# Patient Record
Sex: FEMALE | Race: WHITE | Employment: FULL TIME | URBAN - METROPOLITAN AREA
[De-identification: names, ages, dates, MRNs, and addresses within clinical notes are randomized per-mention and may not be internally consistent; named-entity substitution may affect disease eponyms.]

---

## 2017-01-09 ENCOUNTER — HOSPITAL ENCOUNTER (OUTPATIENT)
Dept: PERINATAL CARE | Age: 32
Discharge: HOME | End: 2017-01-09
Attending: OBSTETRICS & GYNECOLOGY
Payer: COMMERCIAL

## 2017-01-09 ENCOUNTER — ROUTINE PRENATAL (OUTPATIENT)
Dept: OBGYN CLINIC | Age: 32
End: 2017-01-09

## 2017-01-09 VITALS — SYSTOLIC BLOOD PRESSURE: 110 MMHG | WEIGHT: 177 LBS | BODY MASS INDEX: 31.35 KG/M2 | DIASTOLIC BLOOD PRESSURE: 72 MMHG

## 2017-01-09 DIAGNOSIS — Z34.03 NORMAL FIRST PREGNANCY CONFIRMED, CURRENTLY IN THIRD TRIMESTER: Primary | ICD-10-CM

## 2017-01-09 PROCEDURE — 76816 OB US FOLLOW-UP PER FETUS: CPT | Performed by: OBSTETRICS & GYNECOLOGY

## 2017-01-09 NOTE — PATIENT INSTRUCTIONS

## 2017-01-09 NOTE — PROGRESS NOTES
Problem List  Date Reviewed: 12/12/2016          Codes Class Noted    Encounter for supervision of normal first pregnancy in first trimester ICD-10-CM: Z34.01  ICD-9-CM: V22.0  7/26/2016    Overview Addendum 11/23/2016  2:38 PM by Olena Obando     Intrauterine pregnancy with the following problems identified:  EDC 2/20/2017 by US today  10/31/16 Flu vaccine given

## 2017-01-23 ENCOUNTER — ROUTINE PRENATAL (OUTPATIENT)
Dept: OBGYN CLINIC | Age: 32
End: 2017-01-23

## 2017-01-23 VITALS — WEIGHT: 179.8 LBS | SYSTOLIC BLOOD PRESSURE: 110 MMHG | DIASTOLIC BLOOD PRESSURE: 68 MMHG | BODY MASS INDEX: 31.85 KG/M2

## 2017-01-23 DIAGNOSIS — Z34.03 ENCOUNTER FOR SUPERVISION OF NORMAL FIRST PREGNANCY IN THIRD TRIMESTER: Primary | ICD-10-CM

## 2017-01-23 LAB — GRBS, EXTERNAL: NEGATIVE

## 2017-01-23 NOTE — PROGRESS NOTES
Problem List  Date Reviewed: 1/9/2017          Codes Class Noted    Encounter for supervision of normal first pregnancy in first trimester ICD-10-CM: Z34.01  ICD-9-CM: V22.0  7/26/2016    Overview Addendum 1/10/2017  3:46 PM by Olena Obando     Intrauterine pregnancy with the following problems identified:  EDC 2/20/2017 by [address] today  10/31/16 Flu vaccine given  Pyelectasis @ 20 wk US--being followed by [facility]

## 2017-01-23 NOTE — PATIENT INSTRUCTIONS

## 2017-01-24 ENCOUNTER — TELEPHONE (OUTPATIENT)
Dept: OBGYN CLINIC | Age: 32
End: 2017-01-24

## 2017-01-24 NOTE — TELEPHONE ENCOUNTER
28years old  36w1d pregnant patient last seen in the office yesterday. Patient had cervical exam at the visit. Patient calling to say that she had spotting last evening and note this afternoon a \"decent bit of bleeding , brownish red with mucous\" and she has cramping.   Please advise

## 2017-01-24 NOTE — TELEPHONE ENCOUNTER
If she is soaking maxi pads needs to be seen. It's from her exam. Call if contractions q5 x 1 hour.  Make sure baby moving ok

## 2017-01-25 LAB — GP B STREP DNA SPEC QL NAA+PROBE: NEGATIVE

## 2017-01-30 ENCOUNTER — HOSPITAL ENCOUNTER (OUTPATIENT)
Dept: PERINATAL CARE | Age: 32
Discharge: HOME | End: 2017-01-30
Attending: OBSTETRICS & GYNECOLOGY
Payer: COMMERCIAL

## 2017-01-30 PROCEDURE — 76816 OB US FOLLOW-UP PER FETUS: CPT | Performed by: OBSTETRICS & GYNECOLOGY

## 2017-01-31 ENCOUNTER — ROUTINE PRENATAL (OUTPATIENT)
Dept: OBGYN CLINIC | Age: 32
End: 2017-01-31

## 2017-01-31 VITALS — DIASTOLIC BLOOD PRESSURE: 60 MMHG | WEIGHT: 183 LBS | BODY MASS INDEX: 32.42 KG/M2 | SYSTOLIC BLOOD PRESSURE: 112 MMHG

## 2017-01-31 DIAGNOSIS — Z34.03 ENCOUNTER FOR SUPERVISION OF NORMAL FIRST PREGNANCY IN THIRD TRIMESTER: Primary | ICD-10-CM

## 2017-01-31 NOTE — PROGRESS NOTES
Problem List  Date Reviewed: 2017          Codes Class Noted    Encounter for supervision of normal first pregnancy in first trimester ICD-10-CM: Z34.01  ICD-9-CM: V22.0  2016    Overview Addendum 2017  2:10 PM by Royer Larson MD     Intrauterine pregnancy with the following problems identified:  EDC 2017 by US today  10/31/16 Flu vaccine given  Pyelectasis @ 20 wk US--being followed by MF -  fu

## 2017-01-31 NOTE — PATIENT INSTRUCTIONS
Week 37 of Your Pregnancy: Care Instructions  Your Care Instructions    You are near the end of your pregnancy--and you're probably pretty uncomfortable. It may be harder to walk around. Lying down probably isn't comfortable either. You may have trouble getting to sleep or staying asleep. Most women deliver their babies between 40 and 41 weeks. This is a good time to think about packing a bag for the hospital with items you'll need. Then you'll be ready when labor starts. Follow-up care is a key part of your treatment and safety. Be sure to make and go to all appointments, and call your doctor if you are having problems. It's also a good idea to know your test results and keep a list of the medicines you take. How can you care for yourself at home? Learn about breastfeeding  · Breastfeeding is best for your baby and good for you. · Breast milk has antibodies to help your baby fight infections. · Mothers who breastfeed often lose weight faster, because making milk burns calories. · Learning the best ways to hold your baby will make breastfeeding easier. · Let your partner bathe and diaper the baby to keep your partner from feeling left out. Snuggle together when you breastfeed. · You may want to learn how to use a breast pump and store your milk. · If you choose to bottle feed, make the feeding feel like breastfeeding so you can bond with your baby. Always hold your baby and the bottle. Do not prop bottles or let your baby fall asleep with a bottle. Learn about crying  · It is common for babies to cry for 1 to 3 hours a day. Some cry more, some cry less. · Babies don't cry to make you upset or because you are a bad parent. · Crying is how your baby communicates. Your baby may be hungry; have gas; need a diaper change; or feel cold, warm, tired, lonely, or tense. Sometimes babies cry for unknown reasons. · If you respond to your baby's needs, he or she will learn to trust you.   · Try to stay calm when your baby cries. Your baby may get more upset if he or she senses that you are upset. Know how to care for your   · Your baby's umbilical cord stump will drop off on its own, usually between 1 and 2 weeks. To care for your baby's umbilical cord area:  ¨ Clean the area at the bottom of the cord 2 or 3 times a day. ¨ Pay special attention to the area where the cord attaches to the skin. ¨ Keep the diaper folded below the cord. ¨ Use a damp washcloth or cotton ball to sponge bathe your baby until the stump has come off. · Your baby's first dark stool is called meconium. After the meconium is passed, your baby will develop his or her own bowel pattern. ¨ Some babies, especially  babies, have several bowel movements a day. Others have one or two a day, or one every 2 to 3 days. ¨  babies often have loose, yellow stools. Formula-fed babies have more formed stools. ¨ If your baby's stools look like little pellets, he or she is constipated. After 2 days of constipation, call your baby's doctor. · If your baby will be circumcised, you can care for him at home. ¨ Gently rinse his penis with warm water after every diaper change. Do not try to remove the film that forms on the penis. This film will go away on its own. Pat dry. ¨ Put petroleum ointment, such as Vaseline, on the area of the diaper that will touch your baby's penis. This will keep the diaper from sticking to your baby. ¨ Ask the doctor about giving your baby acetaminophen (Tylenol) for pain. Where can you learn more? Go to http://natalia-becky.info/. Enter 68 [id number] in the search box to learn more about \"Week 37 of Your Pregnancy: Care Instructions. \"  Current as of: May 30, 2016  Content Version: 11.1  © 3804-7410 [employer]. Care instructions adapted under license by [employer] (which disclaims liability or warranty for this information).  If you have questions about a medical condition or this instruction, always ask your healthcare professional. [place] [address] any warranty or liability for your use of this information.

## 2017-02-07 ENCOUNTER — ROUTINE PRENATAL (OUTPATIENT)
Dept: OBGYN CLINIC | Age: 32
End: 2017-02-07

## 2017-02-07 VITALS — DIASTOLIC BLOOD PRESSURE: 60 MMHG | BODY MASS INDEX: 32.77 KG/M2 | WEIGHT: 185 LBS | SYSTOLIC BLOOD PRESSURE: 118 MMHG

## 2017-02-07 DIAGNOSIS — Z34.03 ENCOUNTER FOR SUPERVISION OF NORMAL FIRST PREGNANCY IN THIRD TRIMESTER: Primary | ICD-10-CM

## 2017-02-07 NOTE — PATIENT INSTRUCTIONS
Week 38 of Your Pregnancy: Care Instructions  Your Care Instructions    Believe it or not, your baby is almost here. You may have ideas about your baby's personality because of how much he or she moves. Or you may have noticed how he or she responds to sounds, warmth, cold, and light. You may even know what kind of music your baby likes. By now, you have a better idea of what to expect during delivery. You may have talked about your birth preferences with your doctor. But even if you want a vaginal birth, it is a good idea to learn about  births.  birth means that your baby is born through a cut (incision) in your lower belly. It is sometimes the best choice for the health of the baby and the mother. This care sheet can help you understand  births. It also gives you information about what to expect after your baby is born. And it helps you understand more about postpartum depression. Follow-up care is a key part of your treatment and safety. Be sure to make and go to all appointments, and call your doctor if you are having problems. It's also a good idea to know your test results and keep a list of the medicines you take. How can you care for yourself at home? Learn about  birth  · Most C-sections are unplanned. They are done because of problems that occur during labor. These problems might include:  ¨ Labor that slows or stops. ¨ High blood pressure or other problems for the mother. ¨ Signs of distress in the baby. These signs may include a very fast or slow heart rate. · Although most mothers and babies do well after , it is major surgery. It has more risks than a vaginal delivery. · In some cases, a planned  may be safer than a vaginal delivery. This may be the case if:  ¨ The mother has a health problem, such as a heart condition. ¨ The baby isn't in a head-down position for delivery. This is called a breech position.   ¨ The uterus has scars from past surgeries. This could increase the chance of a tear in the uterus. ¨ There is a problem with the placenta. ¨ The mother has an infection, such as genital herpes, that could be spread to the baby. ¨ The mother is having twins or more. ¨ The baby weighs 9 to 10 pounds or more. · Because of the risks of , planned C-sections generally should be done only for medical reasons. And a planned  should be done at 39 weeks or later unless there is a medical reason to do it sooner. Know what to expect after delivery, and plan for the first few weeks at home  · You, your baby, and your partner or  will get identification bands. Only people with matching bands can  the baby from the nursery. · You will learn how to feed, diaper, and bathe your baby. And you will learn how to care for the umbilical cord stump. If your baby will be circumcised, you will also learn how to care for that. · Ask people to wait to visit you until you are at home. And ask them to wash their hands before they touch your baby. · Make sure you have another adult in your home for at least 2 or 3 days after the birth. · During the first 2 weeks, limit when friends and family can visit. · Do not allow visitors who have colds or infections. Make sure all visitors are up to date with their vaccinations. Never let anyone smoke around your baby. · Try to nap when the baby naps. Be aware of postpartum depression  · \"Baby blues\" are common for the first 1 to 2 weeks after birth. You may cry or feel sad or irritable for no reason. · For some women, these feelings last longer and are more intense. This is called postpartum depression. · If your symptoms last for more than a few weeks or you feel very depressed, ask your doctor for help. · Postpartum depression can be treated. Support groups and counseling can help. Sometimes medicine can also help. Where can you learn more?   Go to http://natalia-becky.info/. Enter B044 in the search box to learn more about \"Week 38 of Your Pregnancy: Care Instructions. \"  Current as of: May 30, 2016  Content Version: 11.1  © 8856-1961 "[employer]. Care instructions adapted under license by [employer] (which disclaims liability or warranty for this information). If you have questions about a medical condition or this instruction, always ask your healthcare professional. [address] any warranty or liability for your use of this information.

## 2017-02-07 NOTE — PROGRESS NOTES
Problem List  Date Reviewed: 2017          Codes Class Noted    Encounter for supervision of normal first pregnancy in first trimester ICD-10-CM: Z34.01  ICD-9-CM: V22.0  2016    Overview Addendum 2017  2:10 PM by Kathy Higgins MD     Intrauterine pregnancy with the following problems identified:  EDC 2017 by US today  10/31/16 Flu vaccine given  Pyelectasis @ 20 wk US--being followed by MF -  fu

## 2017-02-15 ENCOUNTER — ROUTINE PRENATAL (OUTPATIENT)
Dept: OBGYN CLINIC | Age: 32
End: 2017-02-15

## 2017-02-15 VITALS — WEIGHT: 185.6 LBS | DIASTOLIC BLOOD PRESSURE: 70 MMHG | BODY MASS INDEX: 32.88 KG/M2 | SYSTOLIC BLOOD PRESSURE: 118 MMHG

## 2017-02-15 DIAGNOSIS — Z34.03 ENCOUNTER FOR SUPERVISION OF NORMAL FIRST PREGNANCY IN THIRD TRIMESTER: Primary | ICD-10-CM

## 2017-02-15 NOTE — PATIENT INSTRUCTIONS
Week 39 of Your Pregnancy: Care Instructions  Your Care Instructions    During these final weeks, you may feel anxious to see your new baby. [place] babies often look different from what you see in pictures or movies. Right after birth, their heads may have a strange shape. Their eyes may be puffy. And their genitals may be swollen. They may also have very dry skin, or red marks on the eyelids, nose, or neck. Still, most parents think their babies are beautiful. Follow-up care is a key part of your treatment and safety. Be sure to make and go to all appointments, and call your doctor if you are having problems. It's also a good idea to know your test results and keep a list of the medicines you take. How can you care for yourself at home? Prepare to breastfeed  · If you are breastfeeding, continue to eat healthy foods. · Avoid alcohol, cigarettes, and drugs. This includes prescription and over-the-counter medicines. · You can help prevent sore nipples if you feed your baby in the correct position. Nurses will help you learn to do this. · Your  will need to be fed about every 1½ to 3 hours. Choose the right birth control after your baby is born  · Women who are breastfeeding can still get pregnant. Use birth control if you don't want to get pregnant. · Intrauterine devices (IUDs) work for women who want to wait at least 2 years before getting pregnant again. They are safe to use while you are breastfeeding. · Depo-Provera can be used while you are breastfeeding. It is a shot you get every 3 months. · Birth control pills work well. But you need a different kind of pill while you are breastfeeding. And when you start taking these pills, you need to make sure to use another type of birth control until you start your second pack. · Diaphragms, cervical caps, tubal implants, and condoms with spermicide work less well after birth.  If you have a diaphragm or cervical cap, you will need to have it refitted. · Tubal ligation (tying your tubes) and vasectomy are both permanent. These are good options if you are sure you are done having children. Where can you learn more? Go to http://natalia-becky.info/. Enter O257 in the search box to learn more about \"Week 39 of Your Pregnancy: Care Instructions. \"  Current as of: May 30, 2016  Content Version: 11.1  © 4013-0137 [employer]. Care instructions adapted under license by [employer] (which disclaims liability or warranty for this information). If you have questions about a medical condition or this instruction, always ask your healthcare professional. [address] any warranty or liability for your use of this information.

## 2017-02-15 NOTE — PROGRESS NOTES
Problem List  Date Reviewed: 2017          Codes Class Noted    Encounter for supervision of normal first pregnancy in first trimester ICD-10-CM: Z34.01  ICD-9-CM: V22.0  2016    Overview Addendum 2017  2:10 PM by Anusha Hickey MD     Intrauterine pregnancy with the following problems identified:  EDC 2017 by US today  10/31/16 Flu vaccine given  Pyelectasis @ 20 wk US--being followed by MF -  fu

## 2017-02-21 ENCOUNTER — ANESTHESIA EVENT (OUTPATIENT)
Dept: LABOR AND DELIVERY | Age: 32
End: 2017-02-21
Payer: COMMERCIAL

## 2017-02-21 ENCOUNTER — ANESTHESIA (OUTPATIENT)
Dept: LABOR AND DELIVERY | Age: 32
End: 2017-02-21
Payer: COMMERCIAL

## 2017-02-21 PROBLEM — Z34.90 PREGNANCY: Status: ACTIVE | Noted: 2017-02-21

## 2017-02-21 PROCEDURE — 77030014125 HC TY EPDRL BBMI -B: Performed by: ANESTHESIOLOGY

## 2017-02-21 PROCEDURE — 74011000250 HC RX REV CODE- 250

## 2017-02-21 RX ORDER — LIDOCAINE HYDROCHLORIDE AND EPINEPHRINE 15; 5 MG/ML; UG/ML
INJECTION, SOLUTION EPIDURAL AS NEEDED
Status: DISCONTINUED | OUTPATIENT
Start: 2017-02-21 | End: 2017-02-21 | Stop reason: HOSPADM

## 2017-02-21 RX ORDER — BUPIVACAINE HYDROCHLORIDE 2.5 MG/ML
INJECTION, SOLUTION EPIDURAL; INFILTRATION; INTRACAUDAL AS NEEDED
Status: DISCONTINUED | OUTPATIENT
Start: 2017-02-21 | End: 2017-02-21 | Stop reason: HOSPADM

## 2017-02-21 RX ADMIN — LIDOCAINE HYDROCHLORIDE AND EPINEPHRINE 3 ML: 15; 5 INJECTION, SOLUTION EPIDURAL at 10:34

## 2017-02-21 RX ADMIN — BUPIVACAINE HYDROCHLORIDE 1 ML: 2.5 INJECTION, SOLUTION EPIDURAL; INFILTRATION; INTRACAUDAL at 10:33

## 2017-02-21 NOTE — ANESTHESIA PREPROCEDURE EVALUATION
Anesthetic History   No history of anesthetic complications            Review of Systems / Medical History  Patient summary reviewed, nursing notes reviewed and pertinent labs reviewed    Pulmonary  Within defined limits                 Neuro/Psych   Within defined limits           Cardiovascular  Within defined limits                Exercise tolerance: >4 METS     GI/Hepatic/Renal  Within defined limits              Endo/Other  Within defined limits           Other Findings              Physical Exam    Airway  Mallampati: II  TM Distance: 4 - 6 cm  Neck ROM: normal range of motion   Mouth opening: Normal     Cardiovascular  Regular rate and rhythm,  S1 and S2 normal,  no murmur, click, rub, or gallop  Rhythm: regular  Rate: normal         Dental  No notable dental hx       Pulmonary  Breath sounds clear to auscultation               Abdominal  GI exam deferred       Other Findings            Anesthetic Plan    ASA: 2  Anesthesia type: epidural and spinal            Anesthetic plan and risks discussed with: Patient

## 2017-02-21 NOTE — ANESTHESIA PROCEDURE NOTES
CSE Block    Start time: 2/21/2017 10:27 AM  End time: 2/21/2017 10:36 AM  Performed by: Dionisio Reed by: Sheryle Pali     Pre-Procedure  Indications: procedure for pain    preanesthetic checklist: patient identified, risks and benefits discussed, anesthesia consent, site marked, patient being monitored and timeout performed    Timeout Time: 10:27        Procedure:   Patient Position:  Seated  Prep Region:  Lumbar  Prep: Betadine and patient draped    Location:  L3-4    Epidural Needle:   Needle Type:  Tuohy  Needle Gauge:  18 G  Injection Technique:  Loss of resistance using saline  Attempts:  1    Spinal Needle:   Needle Type:  Pencil-tip  Needle Gauge:  27 G    Catheter:   Catheter Type:  Standard  Catheter Size:  20 G  Catheter at Skin Depth (cm):  10  Depth in Epidural Space (cm):  4  Events: no blood with aspiration, no cerebrospinal fluid with aspiration, no paresthesia and negative aspiration test    Test Dose:  Lidocaine 1.5% w/ epi and negative    Assessment:   Catheter Secured:  Tegaderm and tape  Insertion:  Uncomplicated  Patient tolerance:  Patient tolerated the procedure well with no immediate complications

## 2017-03-17 ENCOUNTER — OFFICE VISIT (OUTPATIENT)
Dept: OBGYN CLINIC | Age: 32
End: 2017-03-17

## 2017-03-17 RX ORDER — ESCITALOPRAM OXALATE 10 MG/1
10 TABLET ORAL DAILY
Qty: 30 TAB | Refills: 12 | Status: SHIPPED | OUTPATIENT
Start: 2017-03-17 | End: 2017-04-17

## 2017-03-17 NOTE — PROGRESS NOTES
Chief Complaint   Depression      MARIA LUISA Dawson is a 28 y.o. female who presents for the evaluation of postpartum depression. No LMP recorded. The patient complains of anxiety, depression, feeling overwhelmed. The symptoms are moderate. They started 1 week ago. Since then they have become gradually worsening. Associated symptoms: none. The patient denies wanting to harm herself or the baby. She is breast and bottle feeding. Patient states she has ADHD \"and that comes with depression\". Past Medical History:   Diagnosis Date    ADHD (attention deficit hyperactivity disorder), combined type      History reviewed. No pertinent surgical history. Social History     Occupational History    Not on file. Social History Main Topics    Smoking status: Never Smoker    Smokeless tobacco: Never Used    Alcohol use No    Drug use: No    Sexual activity: Not Currently     Partners: Male     Birth control/ protection: None     Family History   Problem Relation Age of Onset    Heart defect Brother     Diabetes Maternal Grandmother     Diabetes Maternal Grandfather     Heart Attack Maternal Grandfather     Stroke Maternal Grandfather     Diabetes Paternal Grandmother     Diabetes Paternal Grandfather        Allergies   Allergen Reactions    Amoxicillin Hives    Penicillins Hives     Prior to Admission medications    Medication Sig Start Date End Date Taking? Authorizing Provider   prenatal multivit-ca-min-fe-fa tab Take  by mouth. Yes Historical Provider   oxyCODONE-acetaminophen (PERCOCET) 5-325 mg per tablet Take 1-2 Tabs by mouth every four (4) hours as needed for Pain. Max Daily Amount: 12 Tabs. 2/22/17   Lisa Joseph MD   calcium carbonate (TUMS) 200 mg calcium (500 mg) chew Take 1 Tab by mouth daily.  Indications: HEARTBURN    Historical Provider        Review of Systems: History obtained from the patient  Constitutional: negative for weight loss, fever, night sweats  Breast: negative for breast lumps, nipple discharge, galactorrhea  GI: negative for change in bowel habits, abdominal pain, black or bloody stools  : negative for frequency, dysuria, hematuria, vaginal discharge  MSK: negative for back pain, joint pain, muscle pain  Skin: negative for itching, rash, hives  Psych: negative for anxiety, depression, change in mood      Objective: There were no vitals taken for this visit. Physical Exam:   PHYSICAL EXAMINATION    Constitutional  · Appearance: well-nourished, well developed, alert, in no acute distress, not teary    Skin  · General Inspection: no rash, no lesions identified    Neurologic/Psychiatric  · Mental Status:  · Orientation: grossly oriented to person, place and time  · Mood and Affect: mood normal, affect appropriate    Assessment:   Pp depression anxiety  No danger to self or baby    Plan:   Try Lexapro 10mg  See counselor      RTO prn if symptoms persist or worsen. Instructions given to pt. Handouts given to pt.

## 2017-03-17 NOTE — PATIENT INSTRUCTIONS
Nutrition for Breastfeeding Mothers: Care Instructions  Your Care Instructions  When a woman breastfeeds her baby, she needs more nutrients to keep herself healthy and to make the baby's milk. Breastfeeding helps build the bond between you and your baby. It gives your baby excellent health benefits. A healthy diet includes eating a variety of foods from the basic food groups: grains, vegetables, fruits, milk and milk products (such as cheese and yogurt), and meat and dried beans. Eating well during breastfeeding will ensure that you stay healthy and your baby grows and develops normally. Follow-up care is a key part of your treatment and safety. Be sure to make and go to all appointments, and call your doctor if you are having problems. It's also a good idea to know your test results and keep a list of the medicines you take. How can you care for yourself at home? · Include 3 to 4 cups of nonfat or low-fat milk or milk products in your diet every day. These include:  ¨ Milk (8 ounces equals 1 cup). ¨ Ice cream (1½ cups equals 1 cup of milk). ¨ Cheese (1½ ounces of cheese equals 1 cup). ¨ Yogurt (8 ounces equals 1 cup). · Eat at least 7 ounces of grains, such as cereals, breads, crackers, rice, or pasta, every day. One ounce is about 1 slice of bread, 1 cup of breakfast cereal, or ½ cup of cooked rice, cereal, or pasta. · Eat 3 cups of vegetables each day. Choices include:  ¨ Dark-green vegetables such as broccoli and spinach. ¨ Orange vegetables such as carrots and sweet potatoes. ¨ Dried beans (such as issa and kidney beans) and peas (such as lentils). · Every day, eat 2 cups of fresh, frozen, or canned fruit. · Eat 6½ ounces each day of protein, such as chicken, fish, lean meat, eggs, peanut butter, dried beans and peas, nuts, and seeds. One egg, 1 tablespoon of peanut butter, or ½ ounce of nuts or seeds equals 1 ounce of protein. A ½ cup of cooked beans equals 2 ounces of protein.   · Drink plenty of fluids, enough so that your urine is light yellow or clear like water. If you have kidney, heart, or liver disease and have to limit fluids, talk with your doctor before you increase the amount of fluids you drink. · Limit caffeine products, such as coffee, tea, chocolate, and some sodas. Caffeine can pass to your baby through breast milk. It may cause fussiness and sleep problems in babies. · Your doctor may recommend a vitamin supplement. Take it as recommended. · Consider joining a breastfeeding support group. These are offered at many hospitals and birthing centers by nurses, nurse-midwives, or lactation consultants. When should you call for help? Watch closely for changes in your health, and be sure to contact your doctor if:  · You feel that you are not making enough milk for your baby. · You are losing a lot of weight. · You do not think your baby is gaining enough weight. · You would like help to plan a healthy diet. Where can you learn more? Go to http://natalia-becky.info/. Enter P234 in the search box to learn more about \"Nutrition for Breastfeeding Mothers: Care Instructions. \"  Current as of: May 30, 2016  Content Version: 11.1  © 9908-8287 [employer]. Care instructions adapted under license by [employer] (which disclaims liability or warranty for this information). If you have questions about a medical condition or this instruction, always ask your healthcare professional. [place] [address] any warranty or liability for your use of this information.

## 2017-04-17 ENCOUNTER — OFFICE VISIT (OUTPATIENT)
Dept: OBGYN CLINIC | Age: 32
End: 2017-04-17

## 2017-04-17 VITALS
SYSTOLIC BLOOD PRESSURE: 98 MMHG | BODY MASS INDEX: 30.09 KG/M2 | DIASTOLIC BLOOD PRESSURE: 64 MMHG | WEIGHT: 169.8 LBS | HEIGHT: 63 IN

## 2017-04-17 NOTE — PROGRESS NOTES
Postpartum evaluation    Mateus Cosme is a 28 y.o. female who presents for a postpartum exam.     She is now six weeks post vaginal delivery    Her baby is doing well. She has had no menses since delivery. She has had the following significant problems since her delivery: none    The patient is bottle feeding without difficulty. She is only breast feeding 1-2x a day    The patient would like to use nothing for birth control. She is currently taking: no medications. She is due for her next AE in 3 months.  Appointment made    Visit Vitals    BP 98/64    Ht 5' 3\" (1.6 m)    Wt 169 lb 12.8 oz (77 kg)    Breastfeeding Yes  Comment: 1-2x day    BMI 30.08 kg/m2       PHYSICAL EXAMINATION    Constitutional  · Appearance: well-nourished, well developed, alert, in no acute distress    HENT  · Head and Face: appears normal    Neck  · Inspection/Palpation: normal appearance, no masses or tenderness  · Lymph Nodes: no lymphadenopathy present  · Thyroid: gland size normal, nontender, no nodules or masses present on palpation    Breasts  · Inspection of Breasts: breasts symmetrical, no skin changes, no discharge present, nipple appearance normal, no skin retraction present  · Palpation of Breasts and Axillae: no masses present on palpation, no breast tenderness  · Axillary Lymph Nodes: no lymphadenopathy present    Gastrointestinal  · Abdominal Examination: abdomen non-tender to palpation, normal bowel sounds, no masses present  · Liver and spleen: no hepatomegaly present, spleen not palpable  · Hernias: no hernias identified    Genitourinary  · External Genitalia: normal appearance for age, no discharge present, no tenderness present, no inflammatory lesions present, no masses present, no atrophy present  · Vagina: normal vaginal vault without central or paravaginal defects, no discharge present, no inflammatory lesions present, no masses present  · Bladder: non-tender to palpation  · Urethra: appears normal  · Cervix: normal   · Uterus: normal size, shape and consistency  · Adnexa: no adnexal tenderness present, no adnexal masses present  · Perineum: perineum within normal limits, no evidence of trauma, no rashes or skin lesions present  · Anus: anus within normal limits, no hemorrhoids present  · Inguinal Lymph Nodes: no lymphadenopathy present    Skin  · General Inspection: no rash, no lesions identified    Neurologic/Psychiatric  · Mental Status:  · Orientation: grossly oriented to person, place and time  · Mood and Affect: mood normal, affect appropriate    Assessment:  Normal postpartum check    Plan:  RTO for AE.

## 2017-04-17 NOTE — PATIENT INSTRUCTIONS
Nutrition for Breastfeeding Mothers: Care Instructions  Your Care Instructions  When a woman breastfeeds her baby, she needs more nutrients to keep herself healthy and to make the baby's milk. Breastfeeding helps build the bond between you and your baby. It gives your baby excellent health benefits. A healthy diet includes eating a variety of foods from the basic food groups: grains, vegetables, fruits, milk and milk products (such as cheese and yogurt), and meat and dried beans. Eating well during breastfeeding will ensure that you stay healthy and your baby grows and develops normally. Follow-up care is a key part of your treatment and safety. Be sure to make and go to all appointments, and call your doctor if you are having problems. It's also a good idea to know your test results and keep a list of the medicines you take. How can you care for yourself at home? · Include 3 to 4 cups of nonfat or low-fat milk or milk products in your diet every day. These include:  ¨ Milk (8 ounces equals 1 cup). ¨ Ice cream (1½ cups equals 1 cup of milk). ¨ Cheese (1½ ounces of cheese equals 1 cup). ¨ Yogurt (8 ounces equals 1 cup). · Eat at least 7 ounces of grains, such as cereals, breads, crackers, rice, or pasta, every day. One ounce is about 1 slice of bread, 1 cup of breakfast cereal, or ½ cup of cooked rice, cereal, or pasta. · Eat 3 cups of vegetables each day. Choices include:  ¨ Dark-green vegetables such as broccoli and spinach. ¨ Orange vegetables such as carrots and sweet potatoes. ¨ Dried beans (such as issa and kidney beans) and peas (such as lentils). · Every day, eat 2 cups of fresh, frozen, or canned fruit. · Eat 6½ ounces each day of protein, such as chicken, fish, lean meat, eggs, peanut butter, dried beans and peas, nuts, and seeds. One egg, 1 tablespoon of peanut butter, or ½ ounce of nuts or seeds equals 1 ounce of protein. A ½ cup of cooked beans equals 2 ounces of protein.   · Drink plenty of fluids, enough so that your urine is light yellow or clear like water. If you have kidney, heart, or liver disease and have to limit fluids, talk with your doctor before you increase the amount of fluids you drink. · Limit caffeine products, such as coffee, tea, chocolate, and some sodas. Caffeine can pass to your baby through breast milk. It may cause fussiness and sleep problems in babies. · Your doctor may recommend a vitamin supplement. Take it as recommended. · Consider joining a breastfeeding support group. These are offered at many hospitals and birthing centers by nurses, nurse-midwives, or lactation consultants. When should you call for help? Watch closely for changes in your health, and be sure to contact your doctor if:  · You feel that you are not making enough milk for your baby. · You are losing a lot of weight. · You do not think your baby is gaining enough weight. · You would like help to plan a healthy diet. Where can you learn more? Go to http://natalia-becky.info/. Enter P234 in the search box to learn more about \"Nutrition for Breastfeeding Mothers: Care Instructions. \"  Current as of: May 30, 2016  Content Version: 11.2  © 7516-7854 [employer]. Care instructions adapted under license by [email or web address] (which disclaims liability or warranty for this information). If you have questions about a medical condition or this instruction, always ask your healthcare professional. [place] [address] any warranty or liability for your use of this information.

## 2019-04-23 ENCOUNTER — OFFICE VISIT (OUTPATIENT)
Dept: OBGYN CLINIC | Age: 34
End: 2019-04-23

## 2019-04-23 DIAGNOSIS — Z01.419 ENCOUNTER FOR GYNECOLOGICAL EXAMINATION WITHOUT ABNORMAL FINDING: Primary | ICD-10-CM

## 2019-04-23 DIAGNOSIS — Z11.51 SPECIAL SCREENING EXAMINATION FOR HUMAN PAPILLOMAVIRUS (HPV): ICD-10-CM

## 2019-04-23 NOTE — PROGRESS NOTES
Julius Jarvis is a ,  29 y.o. female [facility] whose No LMP recorded. was on  who presents for her annual checkup. She is having no significant problems. With regard to the Gardisil vaccine, she is older than the FDA approved age to receive it. Menstrual status:    Her periods are minimal to nonexistent in flow. She is using essentially none pads or tampons per day, minimal to none using 3 year paraguard? ? Had placed at [facility]    She denies dysmenorrhea. She reports no premenstrual symptoms. Contraception:    The current method of family planning is IUD and She declines contraception and counseling. Sexual history:    She  reports that she does not currently engage in sexual activity but has had partners who are Male. She reports using the following method of birth control/protection: None. Medical conditions:    Since her last annual GYN exam about 4/15/2016 ago, she has not the following changes in her health history: none. Pap and Mammogram History:    Her most recent Pap smear was normal obtained 4/15/2016 year(s) ago. The patient has never had a mammogram.    The patient does not have a family history of breast cancer. Past Medical History:   Diagnosis Date    ADHD (attention deficit hyperactivity disorder), combined type     IUD (intrauterine device) in place 2018    Paraguard placed 2018      History reviewed. No pertinent surgical history. Current Outpatient Medications   Medication Sig Dispense Refill    prenatal multivit-ca-min-fe-fa tab Take  by mouth.        Allergies: Amoxicillin and Penicillins   Social History     Socioeconomic History    Marital status:      Spouse name: Not on file    Number of children: Not on file    Years of education: Not on file    Highest education level: Not on file   Occupational History    Not on file   Social Needs    Financial resource strain: Not on file    Food insecurity:     Worry: Not on file Inability: Not on file    Transportation needs:     Medical: Not on file     Non-medical: Not on file   Tobacco Use    Smoking status: Never Smoker    Smokeless tobacco: Never Used   Substance and Sexual Activity    Alcohol use: No    Drug use: No    Sexual activity: Not Currently     Partners: Male     Birth control/protection: None   Lifestyle    Physical activity:     Days per week: Not on file     Minutes per session: Not on file    Stress: Not on file   Relationships    Social connections:     Talks on phone: Not on file     Gets together: Not on file     Attends Jain service: Not on file     Active member of club or organization: Not on file     Attends meetings of clubs or organizations: Not on file     Relationship status: Not on file    Intimate partner violence:     Fear of current or ex partner: Not on file     Emotionally abused: Not on file     Physically abused: Not on file     Forced sexual activity: Not on file   Other Topics Concern    Not on file   Social History Narrative    Not on file     Tobacco History:  reports that she has never smoked. She has never used smokeless tobacco.  Alcohol Abuse:  reports that she does not drink alcohol. Drug Abuse:  reports that she does not use drugs.     Patient Active Problem List   Diagnosis Code    Encounter for supervision of normal first pregnancy in first trimester Z34.01    Pregnancy Z34.90       Review of Systems - History obtained from the patient  Constitutional: negative for weight loss, fever, night sweats  HEENT: negative for hearing loss, earache, congestion, snoring, sorethroat  CV: negative for chest pain, palpitations, edema  Resp: negative for cough, shortness of breath, wheezing  GI: negative for change in bowel habits, abdominal pain, black or bloody stools  : negative for frequency, dysuria, hematuria, vaginal discharge  MSK: negative for back pain, joint pain, muscle pain  Breast: negative for breast lumps, nipple discharge, galactorrhea  Skin :negative for itching, rash, hives  Neuro: negative for dizziness, headache, confusion, weakness  Psych: negative for anxiety, depression, change in mood  Heme/lymph: negative for bleeding, bruising, pallor    Physical Exam    There were no vitals taken for this visit.     Constitutional  · Appearance: well-nourished, well developed, alert, in no acute distress    HENT  · Head and Face: appears normal    Neck  · Inspection/Palpation: normal appearance, no masses or tenderness  · Lymph Nodes: no lymphadenopathy present  · Thyroid: gland size normal, nontender, no nodules or masses present on palpation    Chest  · Respiratory Effort: breathing normal  · Auscultation: normal breath sounds    Cardiovascular  · Heart:  · Auscultation: regular rate and rhythm without murmur    Breasts  · Inspection of Breasts: breasts symmetrical, no skin changes, no discharge present, nipple appearance normal, no skin retraction present  · Palpation of Breasts and Axillae: no masses present on palpation, no breast tenderness  · Axillary Lymph Nodes: no lymphadenopathy present    Gastrointestinal  · Abdominal Examination: abdomen non-tender to palpation, normal bowel sounds, no masses present  · Liver and spleen: no hepatomegaly present, spleen not palpable  · Hernias: no hernias identified    Genitourinary  · External Genitalia: normal appearance for age, no discharge present, no tenderness present, no inflammatory lesions present, no masses present, no atrophy present  · Vagina: normal vaginal vault without central or paravaginal defects, no discharge present, no inflammatory lesions present, no masses present  · Bladder: non-tender to palpation  · Urethra: appears normal  · Cervix: normal, string seen   · Uterus: normal size, shape and consistency  · Adnexa: no adnexal tenderness present, no adnexal masses present  · Perineum: perineum within normal limits, no evidence of trauma, no rashes or skin lesions present  · Anus: anus within normal limits, no hemorrhoids present  · Inguinal Lymph Nodes: no lymphadenopathy present    Skin  · General Inspection: no rash, no lesions identified    Neurologic/Psychiatric  · Mental Status:  · Orientation: grossly oriented to person, place and time  · Mood and Affect: mood normal, affect appropriate    .   Assessment:  Routine gynecologic examination  IUD looks like a [address]:  Counseled re: diet, exercise, healthy lifestyle  Return for yearly wellness visits  Pt will get info on IUD and mychart us  Pap/HPV

## 2019-04-23 NOTE — PATIENT INSTRUCTIONS

## 2019-04-26 LAB
CYTOLOGIST CVX/VAG CYTO: NORMAL
CYTOLOGY CVX/VAG DOC THIN PREP: NORMAL
CYTOLOGY HISTORY:: NORMAL
DX ICD CODE: NORMAL
HPV I/H RISK 1 DNA CVX QL PROBE+SIG AMP: NEGATIVE
Lab: NORMAL
Lab: NORMAL
OTHER STN SPEC: NORMAL
PATH REPORT.FINAL DX SPEC: NORMAL
STAT OF ADQ CVX/VAG CYTO-IMP: NORMAL

## 2020-02-25 ENCOUNTER — OFFICE VISIT (OUTPATIENT)
Dept: OBGYN CLINIC | Age: 35
End: 2020-02-25

## 2020-02-25 VITALS
BODY MASS INDEX: 30.12 KG/M2 | SYSTOLIC BLOOD PRESSURE: 133 MMHG | HEIGHT: 63 IN | WEIGHT: 170 LBS | DIASTOLIC BLOOD PRESSURE: 78 MMHG

## 2020-02-25 DIAGNOSIS — R10.2 PELVIC PAIN: Primary | ICD-10-CM

## 2020-02-25 NOTE — PATIENT INSTRUCTIONS
Intrauterine Device (IUD) Insertion: Care Instructions  Your Care Instructions    The intrauterine device (IUD) is a very effective method of birth control. It is a small, plastic, T-shaped device that contains copper or hormones. The doctor inserts the IUD into your uterus. A plastic string tied to the end of the IUD hangs down through the cervix into the vagina. There are two types of IUDs. The copper IUD is effective for up to 10 years. The hormonal IUD is effective for either 3 years or 5 years, depending on which IUD is used. The hormonal IUD also reduces menstrual bleeding and cramping. Both types of IUD damage or kill the man's sperm. This means that the woman's egg does not join with the sperm. IUDs also change the lining of the uterus so that the egg does not lodge there. The IUD is most likely to work well for women who have been pregnant before. Some women who have never been pregnant have more trouble keeping the IUD in the uterus. They also may have more pain and cramping after insertion. Follow-up care is a key part of your treatment and safety. Be sure to make and go to all appointments, and call your doctor if you are having problems. It's also a good idea to know your test results and keep a list of the medicines you take. How can you care for yourself at home? · You may experience some mild cramping and light bleeding (spotting) for 1 or 2 days. Use a hot water bottle or a heating pad set on low on your belly for pain. · Take an over-the-counter pain medicine, such as acetaminophen (Tylenol), ibuprofen (Advil, Motrin), and naproxen (Aleve) if needed. Read and follow all instructions on the label. · Do not take two or more pain medicines at the same time unless the doctor told you to. Many pain medicines have acetaminophen, which is Tylenol. Too much acetaminophen (Tylenol) can be harmful. · Check the string of your IUD after every period.  To do this, insert a finger into your vagina and feel for the cervix, which is at the top of the vagina and feels harder than the rest of your vagina. You should be able to feel the thin, plastic string coming out of the opening of your cervix. If you cannot feel the string, use another form of birth control and make an appointment with your doctor to have the string checked. · If the IUD comes out, save it and call your doctor. Be sure to use another form of birth control while the IUD is out. · Use latex condoms to protect against sexually transmitted infections (STIs), such as gonorrhea and chlamydia. An IUD does not protect you from STIs. Having one sex partner (who does not have STIs and does not have sex with anyone else) is a good way to avoid STIs. When should you call for help? Call 911 anytime you think you may need emergency care. For example, call if:    · You passed out (lost consciousness).     · You have sudden, severe pain in your belly or pelvis.    Call your doctor now or seek immediate medical care if:    · You have new belly or pelvic pain.     · You have severe vaginal bleeding. This means that you are soaking through your usual pads or tampons each hour for 2 or more hours.     · You are dizzy or lightheaded, or you feel like you may faint.     · You have a fever and pelvic pain or vaginal discharge.     · You have pelvic pain that is getting worse.    Watch closely for changes in your health, and be sure to contact your doctor if:    · You cannot feel the string, or the IUD comes out.     · You feel sick to your stomach, or you vomit.     · You think you may be pregnant. Where can you learn more? Go to http://natalia-becky.info/. Enter H124 in the search box to learn more about \"Intrauterine Device (IUD) Insertion: Care Instructions. \"  Current as of: May 29, 2019  Content Version: 12.2  © 7648-8622 [employer].  Care instructions adapted under license by [employer] (which disclaims liability or warranty for this information). If you have questions about a medical condition or this instruction, always ask your healthcare professional. [place] [address] any warranty or liability for your use of this information.

## 2020-02-25 NOTE — PROGRESS NOTES
This is a follow-up visit for Khadijah Child is a ,  36 y.o. female [facility] whose No LMP recorded. was on . She had a Cash Arnold IUD 2017  She c/o pressure and nausea for the past few months that has been gradually worsening. She states \"I can feel the IUD in there\"    Since the IUD placement, the patient has not had any unusual complaints. She has had some mild non-menstrual bleeding. She describes having a light bleeding on and off since placement. For the past 2 months she has had cramping but no bleeding. She has had no fever. Associated signs and symptoms: she denies dyspareunia, expulsion, heavy bleeding, increased pain, fever, and pelvic pain. Ultrasound was done today and revealed appropriate placement of the IUD in the endometrial cavity. TRANSVAGINAL ULTRASOUND PERFORMED  UTERUS IS ANTEVERTED, NORMAL IN SIZE AND ECHOGENICITY. ENDOMETRIUM MEASURES 4MM IN THICKNESS. NO EVIDENCE OF MASSES OR ABNORMALITIES ARE SEEN. IUD IS SEEN IN THE PROPER POSITION WITHIN THE ENDOMETRIAL CAVITY IN THE UTERINE FUNDUS. RIGHT OVARY APPEARS WITHIN NORMAL LIMITS. LEFT OVARY APPEARS WITHIN NORMAL LIMITS. SCANT FREE FLUID SEEN IN THE CDS. Past Medical History:   Diagnosis Date    ADHD (attention deficit hyperactivity disorder), combined type     IUD (intrauterine device) in place 2017    Paraguard placed 2017     No past surgical history on file.   Social History     Occupational History    Not on file   Tobacco Use    Smoking status: Never Smoker    Smokeless tobacco: Never Used   Substance and Sexual Activity    Alcohol use: No    Drug use: No    Sexual activity: Not Currently     Partners: Male     Birth control/protection: None     Family History   Problem Relation Age of Onset    Heart defect Brother     Diabetes Maternal Grandmother     Diabetes Maternal Grandfather     Heart Attack Maternal Grandfather     Stroke Maternal Grandfather     Diabetes Paternal Grandmother  Diabetes Paternal Grandfather        Allergies   Allergen Reactions    Amoxicillin Hives    Penicillins Hives     Prior to Admission medications    Medication Sig Start Date End Date Taking? Authorizing Provider   prenatal multivit-ca-min-fe-fa tab Take  by mouth.     Provider, Historical        Review of Systems: History obtained from the patient  Constitutional: negative for weight loss, fever, night sweats  Breast: negative for breast lumps, nipple discharge, galactorrhea  GI: negative for change in bowel habits, abdominal pain, black or bloody stools  : negative for frequency, dysuria, hematuria, vaginal discharge  MSK: negative for back pain, joint pain, muscle pain  Skin: negative for itching, rash, hives  Psych: negative for anxiety, depression, change in mood      Objective:  Visit Vitals  /78   Ht 5' 3\" (1.6 m)   Wt 170 lb (77.1 kg)   BMI 30.11 kg/m²       Physical Exam:   PHYSICAL EXAMINATION    Constitutional  · Appearance: well-nourished, well developed, alert, in no acute distress    Gastrointestinal  · Abdominal Examination: abdomen non-tender to palpation, normal bowel sounds, no masses present  · Liver and spleen: no hepatomegaly present, spleen not palpable  · Hernias: no hernias identified    Genitourinary  · External Genitalia: normal appearance for age, no discharge present, no tenderness present, no inflammatory lesions present, no masses present, no atrophy present  · Vagina: normal vaginal vault without central or paravaginal defects, no discharge present, no inflammatory lesions present, no masses present  · Bladder: non-tender to palpation  · Urethra: appears normal  · Cervix: normal with IUD string visible and appropriate length - blue c/w kyleena   · Uterus: normal size, shape and consistency  · Adnexa: no adnexal tenderness present, no adnexal masses present  · Perineum: perineum within normal limits, no evidence of trauma, no rashes or skin lesions present    Skin  · General Inspection: no rash, no lesions identified    Neurologic/Psychiatric  · Mental Status:  · Orientation: grossly oriented to person, place and time  · Mood and Affect: mood normal, affect appropriate    Assessment:   Intermittent discomfort with IUD    Plan:     If continues, consider switch out - place Mirena

## 2021-11-11 ENCOUNTER — OFFICE VISIT (OUTPATIENT)
Dept: OBGYN CLINIC | Age: 36
End: 2021-11-11
Payer: COMMERCIAL

## 2021-11-11 VITALS
BODY MASS INDEX: 33.31 KG/M2 | SYSTOLIC BLOOD PRESSURE: 129 MMHG | HEIGHT: 63 IN | WEIGHT: 188 LBS | DIASTOLIC BLOOD PRESSURE: 94 MMHG

## 2021-11-11 DIAGNOSIS — Z01.419 ENCOUNTER FOR GYNECOLOGICAL EXAMINATION (GENERAL) (ROUTINE) WITHOUT ABNORMAL FINDINGS: Primary | ICD-10-CM

## 2021-11-11 PROCEDURE — 99395 PREV VISIT EST AGE 18-39: CPT | Performed by: OBSTETRICS & GYNECOLOGY

## 2021-11-11 NOTE — PATIENT INSTRUCTIONS
Well Visit, Ages 25 to 48: Care Instructions  Overview     Well visits can help you stay healthy. Your doctor has checked your overall health and may have suggested ways to take good care of yourself. Your doctor also may have recommended tests. At home, you can help prevent illness with healthy eating, regular exercise, and other steps. Follow-up care is a key part of your treatment and safety. Be sure to make and go to all appointments, and call your doctor if you are having problems. It's also a good idea to know your test results and keep a list of the medicines you take. How can you care for yourself at home? · Get screening tests that you and your doctor decide on. Screening helps find diseases before any symptoms appear. · Eat healthy foods. Choose fruits, vegetables, whole grains, protein, and low-fat dairy foods. Limit fat, especially saturated fat. Reduce salt in your diet. · Limit alcohol. If you are a man, have no more than 2 drinks a day or 14 drinks a week. If you are a woman, have no more than 1 drink a day or 7 drinks a week. · Get at least 30 minutes of physical activity on most days of the week. Walking is a good choice. You also may want to do other activities, such as running, swimming, cycling, or playing tennis or team sports. Discuss any changes in your exercise program with your doctor. · Reach and stay at a healthy weight. This will lower your risk for many problems, such as obesity, diabetes, heart disease, and high blood pressure. · Do not smoke or allow others to smoke around you. If you need help quitting, talk to your doctor about stop-smoking programs and medicines. These can increase your chances of quitting for good. · Care for your mental health. It is easy to get weighed down by worry and stress. Learn strategies to manage stress, like deep breathing and mindfulness, and stay connected with your family and community.  If you find you often feel sad or hopeless, talk with your doctor. Treatment can help. · Talk to your doctor about whether you have any risk factors for sexually transmitted infections (STIs). You can help prevent STIs if you wait to have sex with a new partner (or partners) until you've each been tested for STIs. It also helps if you use condoms (male or female condoms) and if you limit your sex partners to one person who only has sex with you. Vaccines are available for some STIs, such as HPV. · Use birth control if it's important to you to prevent pregnancy. Talk with your doctor about the choices available and what might be best for you. · If you think you may have a problem with alcohol or drug use, talk to your doctor. This includes prescription medicines (such as amphetamines and opioids) and illegal drugs (such as cocaine and methamphetamine). Your doctor can help you figure out what type of treatment is best for you. · Protect your skin from too much sun. When you're outdoors from 10 a.m. to 4 p.m., stay in the shade or cover up with clothing and a hat with a wide brim. Wear sunglasses that block UV rays. Even when it's cloudy, put broad-spectrum sunscreen (SPF 30 or higher) on any exposed skin. · See a dentist one or two times a year for checkups and to have your teeth cleaned. · Wear a seat belt in the car. When should you call for help? Watch closely for changes in your health, and be sure to contact your doctor if you have any problems or symptoms that concern you. Where can you learn more? Go to http://www.[employer].com/  Enter P072 in the search box to learn more about \"Well Visit, Ages 25 to 48: Care Instructions. \"  Current as of: February 11, 2021               Content Version: 13.0  © 6125-4352 Healthwise, Incorporated. Care instructions adapted under license by [employer] (which disclaims liability or warranty for this information).  If you have questions about a medical condition or this instruction, always ask your healthcare professional. [place] [address] any warranty or liability for your use of this information.

## 2021-11-11 NOTE — PROGRESS NOTES
Chepe Smith is a ,  29 y.o. female WHITE/NON- whose No LMP recorded. (Menstrual status: IUD). who presents for her annual checkup. She is having no significant problems. With regard to the Gardisil vaccine, she is older than the FDA approved age to receive it. Menstrual status:    Her periods are minimal to nonexistent in flow. She is using essentially none pads or tampons per day, minimal to none using Kyleena? ? Blue string seen at last visit - had placed at [address] not received. She denies dysmenorrhea. She reports no premenstrual symptoms. Contraception:    The current method of family planning is IUD and She declines contraception and counseling. Sexual history:    She  reports being sexually active and has had partner(s) who are male. She reports using the following method of birth control/protection: I.U.D..    Medical conditions:    Since her last annual GYN exam about two years ago, she has not the following changes in her health history: none. Pap and Mammogram History:    Her most recent Pap smear was 2019 normal/HPV neg    The patient has never had a mammogram.    The patient does not have a family history of breast cancer. Past Medical History:   Diagnosis Date    ADHD (attention deficit hyperactivity disorder), combined type     IUD (intrauterine device) in place 2017    Paraguard placed 2017     History reviewed. No pertinent surgical history. Current Outpatient Medications   Medication Sig Dispense Refill    prenatal multivit-ca-min-fe-fa tab Take  by mouth.  (Patient not taking: Reported on 2021)       Allergies: Amoxicillin and Penicillins   Social History     Socioeconomic History    Marital status:      Spouse name: Not on file    Number of children: Not on file    Years of education: Not on file    Highest education level: Not on file   Occupational History    Not on file   Tobacco Use    Smoking status: Never Smoker    Smokeless tobacco: Never Used   Substance and Sexual Activity    Alcohol use: No    Drug use: No    Sexual activity: Yes     Partners: Male     Birth control/protection: I.U.D. Other Topics Concern    Not on file   Social History Narrative    Not on file     Social Determinants of Health     Financial Resource Strain:     Difficulty of Paying Living Expenses: Not on file   Food Insecurity:     Worried About Running Out of Food in the Last Year: Not on file    Yung of Food in the Last Year: Not on file   Transportation Needs:     Lack of Transportation (Medical): Not on file    Lack of Transportation (Non-Medical): Not on file   Physical Activity:     Days of Exercise per Week: Not on file    Minutes of Exercise per Session: Not on file   Stress:     Feeling of Stress : Not on file   Social Connections:     Frequency of Communication with Friends and Family: Not on file    Frequency of Social Gatherings with Friends and Family: Not on file    Attends Anabaptism Services: Not on file    Active Member of [address] [place] [address] or Organizations: Not on file    Attends Club or Organization Meetings: Not on file    Marital Status: Not on file   Intimate Partner Violence:     Fear of Current or Ex-Partner: Not on file    Emotionally Abused: Not on file    Physically Abused: Not on file    Sexually Abused: Not on file   Housing Stability:     Unable to Pay for Housing in the Last Year: Not on file    Number of [place] in the Last Year: Not on file    Unstable Housing in the Last Year: Not on file     Tobacco History:  reports that she has never smoked. She has never used smokeless tobacco.  Alcohol Abuse:  reports no history of alcohol use. Drug Abuse:  reports no history of drug use.     Patient Active Problem List   Diagnosis Code    Encounter for supervision of normal first pregnancy in first trimester Z34.01    Pregnancy Z34.90       Review of Systems - History obtained from the patient  Constitutional: negative for weight loss, fever, night sweats  HEENT: negative for hearing loss, earache, congestion, snoring, sorethroat  CV: negative for chest pain, palpitations, edema  Resp: negative for cough, shortness of breath, wheezing  GI: negative for change in bowel habits, abdominal pain, black or bloody stools  : negative for frequency, dysuria, hematuria, vaginal discharge  MSK: negative for back pain, joint pain, muscle pain  Breast: negative for breast lumps, nipple discharge, galactorrhea  Skin :negative for itching, rash, hives  Neuro: negative for dizziness, headache, confusion, weakness  Psych: negative for anxiety, depression, change in mood  Heme/lymph: negative for bleeding, bruising, pallor    Physical Exam    Visit Vitals  BP (!) 129/94   Ht 5' 3\" (1.6 m)   Wt 188 lb (85.3 kg)   Breastfeeding No   BMI 33.30 kg/m²       Constitutional  · Appearance: well-nourished, well developed, alert, in no acute distress    HENT  · Head and Face: appears normal    Neck  · Inspection/Palpation: normal appearance, no masses or tenderness  · Lymph Nodes: no lymphadenopathy present  · Thyroid: gland size normal, nontender, no nodules or masses present on palpation    Chest  · Respiratory Effort: breathing normal  · Auscultation: normal breath sounds    Cardiovascular  · Heart:  · Auscultation: regular rate and rhythm without murmur    Breasts  · Inspection of Breasts: breasts symmetrical, no skin changes, no discharge present, nipple appearance normal, no skin retraction present  · Palpation of Breasts and Axillae: no masses present on palpation, no breast tenderness  · Axillary Lymph Nodes: no lymphadenopathy present    Gastrointestinal  · Abdominal Examination: abdomen non-tender to palpation, normal bowel sounds, no masses present  · Liver and spleen: no hepatomegaly present, spleen not palpable  · Hernias: no hernias identified    Genitourinary  · External Genitalia: normal appearance for age, no discharge present, no tenderness present, no inflammatory lesions present, no masses present, no atrophy present  · Vagina: normal vaginal vault without central or paravaginal defects, no discharge present, no inflammatory lesions present, no masses present  · Bladder: non-tender to palpation  · Urethra: appears normal  · Cervix: normal   · Uterus: normal size, shape and consistency  · Adnexa: no adnexal tenderness present, no adnexal masses present  · Perineum: perineum within normal limits, no evidence of trauma, no rashes or skin lesions present  · Anus: anus within normal limits, no hemorrhoids present  · Inguinal Lymph Nodes: no lymphadenopathy present    Skin  · General Inspection: no rash, no lesions identified    Neurologic/Psychiatric  · Mental Status:  · Orientation: grossly oriented to person, place and time  · Mood and Affect: mood normal, affect appropriate    . Assessment:  Routine gynecologic examination  Her current medical status is satisfactory with no evidence of significant gynecologic issues.     Plan:  Counseled re: diet, exercise, healthy lifestyle  Return for yearly wellness visits  Due to switch Vernia Riedel by 5/22 - sophia Flowers

## 2022-03-14 ENCOUNTER — OFFICE VISIT (OUTPATIENT)
Dept: OBGYN CLINIC | Age: 37
End: 2022-03-14
Payer: COMMERCIAL

## 2022-03-14 VITALS — WEIGHT: 187.6 LBS | SYSTOLIC BLOOD PRESSURE: 119 MMHG | DIASTOLIC BLOOD PRESSURE: 77 MMHG | BODY MASS INDEX: 33.23 KG/M2

## 2022-03-14 DIAGNOSIS — Z30.433 ENCOUNTER FOR IUD REMOVAL AND REINSERTION: Primary | ICD-10-CM

## 2022-03-14 PROCEDURE — 58300 INSERT INTRAUTERINE DEVICE: CPT | Performed by: OBSTETRICS & GYNECOLOGY

## 2022-03-14 PROCEDURE — 58301 REMOVE INTRAUTERINE DEVICE: CPT | Performed by: OBSTETRICS & GYNECOLOGY

## 2022-03-14 NOTE — PROGRESS NOTES
[facility]  OFFICE PROCEDURE PROGRESS NOTE        Chart reviewed for the following:   I, Eduard Ashton, have reviewed the History, Physical and updated the Allergic reactions for [address] performed immediately prior to start of procedure:   Frank Mascorro, have performed the following reviews on Flaco Campos prior to the start of the procedure:            * Patient was identified by name and date of birth   * Agreement on procedure being performed was verified  * Risks and Benefits explained to the patient  * Procedure site verified and marked as necessary  * Patient was positioned for comfort  * Consent was signed and verified     Time: 11:13 AM        Date of procedure: 3/14/2022    Procedure performed by:  Sin Castaneda MD    How tolerated by patient: tolerated the procedure well with no complications    Post Procedural Pain Scale: 2 - Hurts Little Bit    Comments: none          -----------------------------------IUD REPLACEMENT---------------------  Indications for Removal:  Flaco Campos is a ,  29 y.o. female WHITE/NON- whose No LMP recorded. (Menstrual status: IUD). was on . who presents today for IUD replacement. Her current IUD was placed 5 years ago. She has not had  problems with the IUD. She requests replacement of the IUD because the IUD effectiveness has . The IUD removal procedure was discussed with the patient and she had no further questions. Procedure: The patient was placed in a dorsal lithotomy position and appropriately draped. On bimanual exam the uterus was anterior and normal in size with no tenderness present. A speculum exam was performed and the cervix was visualized. The cervix was prepped with zephiran solution. The IUD string was visualized. Using ring forceps , the string was grasped and the IUD removed intact.  The IUD was shown to the patient.     -----------------------------------IUD INSERTION----------------------------------------- Indications: The risks, benefits and alternatives of IUD insertion were discussed in detail. She also has reviewed Mirena information. She has elected to proceed with the insertion today and she states she has no further questions. A urine pregnancy test was negative today. Procedure: The pelvic exam revealed normal external genitalia. On bimanual exam the uterus was anteverted and normal in size with no tenderness present. A speculum was inserted into the vagina and the cervix was visualized. The cervix was prepped with zephiran solution. The anterior lip of the cervix was grasped with a single toothed tenaculum. The uterus was sounded with a Melchor sound to 7 centimeters. A Mirena was then inserted without difficulty. The string was cut to 3 centimeters. She experienced a moderate amount of cramping. Post Procedure Status: She tolerated the procedure with moderate. The patient received Mirena lot number [id number].     Patient advised risk of expulsion and bleeding  Follow-up in 4 weeks with ultrasound

## 2022-03-19 PROBLEM — Z34.90 PREGNANCY: Status: ACTIVE | Noted: 2017-02-21

## 2022-04-15 NOTE — PROGRESS NOTES
IUD followup note    This is a follow-up visit for Maddie Medina is a ,  29 y.o. female WHITE/NON- No LMP recorded. (Menstrual status: IUD). .     She had an Mirena IUD placed five weeks ago. Since the IUD placement, the patient has not had any unusual complaints. She has had some mild non-menstrual bleeding. She describes having a spotting amount of blood-tinged discharge which has occurred off and on since insertion of the Mirena. She has not significant pain. She has had no fever. Associated signs and symptoms: she denies dyspareunia, expulsion, heavy bleeding, increased pain, fever, and pelvic pain. Ultrasound was done today and revealed appropriate placement of the IUD in the endometrial cavity. TRANSVAGINAL ULTRASOUND PERFORMED  UTERUS IS ANTEVERTED, NORMAL IN SIZE AND ECHOGENICITY. ENDOMETRIUM MEASURES 4-5MM IN THICKNESS. NO EVIDENCE OF MASSES OR ABNORMALITIES ARE SEEN. IUD IS SEEN IN THE PROPER POSITION WITHIN THE ENDOMETRIAL CAVITY IN THE UTERINE FUNDUS. RIGHT OVARY APPEARS TO HAVE A CYST WITH LOW LEVEL ECHOES AND BLOODFLOW IN THE PERIPHERY THAT  MEASURES 47 X 38 X 32MM. THIS MAY BE HEMORRHAGIC. LEFT OVARY APPEARS WITHIN NORMAL LIMITS. NO FREE FLUID SEEN IN THE CDS. Past Medical History:   Diagnosis Date    ADHD (attention deficit hyperactivity disorder), combined type     IUD (intrauterine device) in place 2017    Paraguard placed 2017     No past surgical history on file. Social History     Occupational History    Not on file   Tobacco Use    Smoking status: Never Smoker    Smokeless tobacco: Never Used   Substance and Sexual Activity    Alcohol use: No    Drug use: No    Sexual activity: Yes     Partners: Male     Birth control/protection: I.U.D.      Family History   Problem Relation Age of Onset    Heart defect Brother     Diabetes Maternal Grandmother     Diabetes Maternal Grandfather     Heart Attack Maternal Grandfather     Stroke Maternal Grandfather     Diabetes Paternal Grandmother     Diabetes Paternal Grandfather        Allergies   Allergen Reactions    Amoxicillin Hives    Penicillins Hives     Prior to Admission medications    Medication Sig Start Date End Date Taking? Authorizing Provider   prenatal multivit-ca-min-fe-fa tab Take  by mouth.   Patient not taking: Reported on 11/11/2021    Provider, Historical        Review of Systems: History obtained from the patient  Constitutional: negative for weight loss, fever, night sweats  Breast: negative for breast lumps, nipple discharge, galactorrhea  GI: negative for change in bowel habits, abdominal pain, black or bloody stools  : negative for frequency, dysuria, hematuria, vaginal discharge  MSK: negative for back pain, joint pain, muscle pain  Skin: negative for itching, rash, hives  Psych: negative for anxiety, depression, change in mood      Objective:  Visit Vitals  /75   Wt 186 lb (84.4 kg)   BMI 32.95 kg/m²       Physical Exam:   PHYSICAL EXAMINATION    Constitutional  · Appearance: well-nourished, well developed, alert, in no acute distress    Gastrointestinal  · Abdominal Examination: abdomen non-tender to palpation, normal bowel sounds, no masses present  · Liver and spleen: no hepatomegaly present, spleen not palpable  · Hernias: no hernias identified    Genitourinary  · External Genitalia: normal appearance for age, no discharge present, no tenderness present, no inflammatory lesions present, no masses present, no atrophy present  · Vagina: normal vaginal vault without central or paravaginal defects, no discharge present, no inflammatory lesions present, no masses present  · Bladder: non-tender to palpation  · Urethra: appears normal  · Cervix: normal with IUD string visible and appropriate length   · Uterus: normal size, shape and consistency  · Adnexa: no adnexal tenderness present, no adnexal masses present  · Perineum: perineum within normal limits, no evidence of trauma, no rashes or skin lesions present    Skin  · General Inspection: no rash, no lesions identified    Neurologic/Psychiatric  · Mental Status:  · Orientation: grossly oriented to person, place and time  · Mood and Affect: mood normal, affect appropriate    Assessment:  Doing well with expected mild irregular bleeding. Right hem cyst    Plan:   RTO for AE as scheduled.   Recheck cyst in 6 weeks

## 2022-04-15 NOTE — PATIENT INSTRUCTIONS
Learning About Birth Control: Intrauterine Device (IUD)  What is an intrauterine device (IUD)? The intrauterine device (IUD) is used to prevent pregnancy. It's a small, plastic, T-shaped device. Your doctor places the IUD in your uterus. If you and your doctor discuss it before you give birth, this can be done right after you have your baby. You have a choice between a hormonal IUD and a copper IUD. The hormonal IUD can prevent pregnancy for 3 to 6 years, depending on which IUD is used. But your doctor may talk to you about leaving it in for longer. When you have it, you don't have to do anything else to prevent pregnancy. The copper IUD can prevent pregnancy for 10 years. But your doctor may talk to you about leaving it in for longer. When you have it, you don't have to do anything else to prevent pregnancy. A string tied to the end of the IUD hangs down through the opening of the uterus (called the cervix) into the vagina. You can check that the IUD is in place by feeling for the string. The IUD usually stays in the uterus until your doctor removes it. How well does an IUD for birth control work? In the first year of use:  · When the hormonal IUD is used exactly as directed, fewer than 1 out of 100 women have an unplanned pregnancy. · When the copper IUD is used exactly as directed, fewer than 1 out of 100 women have an unplanned pregnancy. Be sure to tell your doctor about any health problems you have or medicines you take. Your doctor can help you choose the birth control method that's right for you. What are the advantages of an IUD? · An IUD is one of the most effective methods of birth control. · It prevents pregnancy for 3 to 10 years, depending on the type. You don't have to worry about birth control during this time. · It's safe to use while breastfeeding. · IUDs don't contain estrogen.  So you can use an IUD if you don't want to take estrogen or can't take estrogen because you have certain health problems or concerns. · An IUD is convenient. It is always providing birth control. You don't need to remember to take a pill or get a shot. You don't have to interrupt sex to protect against pregnancy. · A hormonal IUD may reduce heavy bleeding and cramping. What are the disadvantages of an IUD? · An IUD doesn't protect against sexually transmitted infections (STIs), such as herpes or HIV/AIDS. If you aren't sure if your sex partner might have an STI, use a condom to protect against disease. · A copper IUD may cause periods with more bleeding and cramping. · You have to see a doctor to have an IUD inserted and removed. Where can you learn more? Go to http://www.[employer].com/  Enter B486 in the search box to learn more about \"Learning About Birth Control: Intrauterine Device (IUD). \"  Current as of: June 16, 2021               Content Version: 13.2  © 2006-2022 Healthwise, [facility]. Care instructions adapted under license by [employer] (which disclaims liability or warranty for this information). If you have questions about a medical condition or this instruction, always ask your healthcare professional. [address] any warranty or liability for your use of this information.

## 2022-04-18 ENCOUNTER — OFFICE VISIT (OUTPATIENT)
Dept: OBGYN CLINIC | Age: 37
End: 2022-04-18

## 2022-04-18 VITALS — BODY MASS INDEX: 32.95 KG/M2 | DIASTOLIC BLOOD PRESSURE: 75 MMHG | SYSTOLIC BLOOD PRESSURE: 131 MMHG | WEIGHT: 186 LBS

## 2022-04-18 DIAGNOSIS — N83.201 RIGHT OVARIAN CYST: Primary | ICD-10-CM

## 2022-04-18 DIAGNOSIS — Z30.431 IUD CHECK UP: ICD-10-CM

## 2022-04-18 PROCEDURE — 99212 OFFICE O/P EST SF 10 MIN: CPT | Performed by: OBSTETRICS & GYNECOLOGY

## 2022-06-03 ENCOUNTER — OFFICE VISIT (OUTPATIENT)
Dept: OBGYN CLINIC | Age: 37
End: 2022-06-03
Payer: COMMERCIAL

## 2022-06-03 VITALS
HEART RATE: 90 BPM | DIASTOLIC BLOOD PRESSURE: 84 MMHG | WEIGHT: 182.8 LBS | SYSTOLIC BLOOD PRESSURE: 130 MMHG | BODY MASS INDEX: 32.38 KG/M2

## 2022-06-03 DIAGNOSIS — N83.209 CYST OF OVARY, UNSPECIFIED LATERALITY: Primary | ICD-10-CM

## 2022-06-03 PROCEDURE — 99212 OFFICE O/P EST SF 10 MIN: CPT | Performed by: OBSTETRICS & GYNECOLOGY

## 2022-06-03 NOTE — PROGRESS NOTES
Ovarian cyst evaluation    Chief Complaint   No chief complaint on file. No LMP recorded. (Menstrual status: IUD). Johana GLASS    Adnexal Mass History:  29 y.o. female presents with {Side:88010} ovarian cyst first noticed by *** approximately {0-12:53119} {days/wks/mos/yrs:819479} ago. Current Method of Contraception is: ***    Imaging History:  She had  {IMAGING STUDIES:68842}    TRANSVAGINAL ULTRASOUND PERFORMED  UTERUS IS ANTEVERTED, NORMAL IN SIZE AND ECHOGENICITY. ENDOMETRIUM MEASURES 4-5MM IN THICKNESS. NO EVIDENCE OF MASSES OR ABNORMALITIES ARE SEEN. IUD IS SEEN IN THE PROPER POSITION WITHIN THE ENDOMETRIAL CAVITY IN THE UTERINE FUNDUS. RIGHT OVARY APPEARS TO HAVE A CYST WITH BLOODFLOW IN THE PERIPHERY THAT MEASURES 18 X 19MM. THE CYST APPEARS SMALLER THAN PREVIOUSLY SEEN ON 04/18/2022. LEFT OVARY APPEARS WITHIN NORMAL LIMITS. A FOLLICULAR CYST IS SEEN. NO FREE FLUID SEEN IN THE CDS. Additional/Aggravating/Alleviating factors:  She admits to the following additional symptoms: *** adnexal tenderness and discomfort  She denies severe abdominal pain and peritoneal symptoms  The patient denies aggravating factors  The patient  {has/denies:5300::\"denies\"} alleviating factors    Past History  She has no past medical history that is notable  She  {has/denies:5300::\"denies\"} she had this condition in the past    Past Medical History:   Diagnosis Date    ADHD (attention deficit hyperactivity disorder), combined type     IUD (intrauterine device) in place 05/2017    Paraguard placed 5/2017     No past surgical history on file. Social History     Occupational History    Not on file   Tobacco Use    Smoking status: Never Smoker    Smokeless tobacco: Never Used   Substance and Sexual Activity    Alcohol use: No    Drug use: No    Sexual activity: Yes     Partners: Male     Birth control/protection: I.U.D.      Family History   Problem Relation Age of Onset    Heart defect Brother     Diabetes Maternal Grandmother     Diabetes Maternal Grandfather     Heart Attack Maternal Grandfather     Stroke Maternal Grandfather     Diabetes Paternal Grandmother     Diabetes Paternal Grandfather        Allergies   Allergen Reactions    Amoxicillin Hives    Penicillins Hives     Prior to Admission medications    Medication Sig Start Date End Date Taking? Authorizing Provider   prenatal multivit-ca-min-fe-fa tab Take  by mouth. Patient not taking: Reported on 11/11/2021    Provider, Historical        Review of Systems - History obtained from the patient  Negative for:   GI: nausea, vomiting, diarrhea, blood in stools  : see HPI  Skin: negative for rash, hives  Endocrine: negative for polyuria, polydypsia  Heme/lymph: negative for bleeding, bruising, lymph node enlargement or tenderness    Objective: There were no vitals taken for this visit.     Physical Exam:   PHYSICAL EXAMINATION    Constitutional  · Appearance: well-nourished, well developed, alert, in no acute distress    HENT  · Head and Face: appears normal    Gastrointestinal  · Abdominal Examination: abdomen non-tender to palpation, normal bowel sounds, no masses present  · Liver and spleen: no hepatomegaly present, spleen not palpable  · Hernias: no hernias identified    Genitourinary  · External Genitalia: normal appearance for age, no discharge present, no tenderness present, no inflammatory lesions present, no masses present, no atrophy present  · Vagina: normal vaginal vault without central or paravaginal defects, no discharge present, no inflammatory lesions present, no masses present  · Bladder: non-tender to palpation  · Urethra: appears normal  · Cervix: normal   · Uterus: normal size, shape and consistency  · Adnexa: *** adnexal tenderness present, *** adnexal masses present  · Perineum: perineum within normal limits, no evidence of trauma, no rashes or skin lesions present  · Anus: anus within normal limits, no hemorrhoids present  · Inguinal Lymph Nodes: no lymphadenopathy present    Skin  · General Inspection: no rash, no lesions identified    Neurologic/Psychiatric  · Mental Status:  · Orientation: grossly oriented to person, place and time  · Mood and Affect: mood normal, affect appropriate    Assessment:   *** ovarian cyst/mass. Plan:   ***.  US  Followup in *** weeks  RTO prn worsening of symptoms.

## 2022-06-03 NOTE — PROGRESS NOTES
Ultrasound followup    Jeferson Whitten is a 29 y.o. female is here today to review the results of her ultrasound evaluation. Her U/S evaluation is performed because of a previous encounter revealing an ovarian cyst, likely hemorrhagic which was identified 6 weeks ago at IUD fu  She is here for an fu ultrasound study. The sonogram: there is a 2cm cyst on the right ovary    See detailed report for more information. Disc with patient. Cyst likely a new one from ovulation. At the worst the cyst is smaller. Okay to observe - patient asx      On this date 06/03/2022 I have spent 15 minutes reviewing previous notes, test results and face to face with the patient discussing the diagnosis and importance of compliance with the treatment plan as well as documenting on the day of the visit.

## 2023-09-28 NOTE — PROGRESS NOTES
Del Pérez is a 40 y.o. female returns for an annual exam     Chief Complaint   Patient presents with    Annual Exam       Patient's last menstrual period was 09/21/2023 (approximate). Her periods are spotting in flow and irregular  She has dysmenorrhea. Problems: problems - irregular menses and pelvic pain  Birth Control: IUD. 3/14/2022 mirena placed  Last Pap: 4/15/2016 NILM/ HPV criteria not met  She does not have a history of BOBBI 2, 3 or cervical cancer. With regard to the Gardisil vaccine, she is older than the FDA approved age to receive it      Examination chaperoned by Angeline Bhatia LPN.

## 2023-10-05 ENCOUNTER — OFFICE VISIT (OUTPATIENT)
Age: 38
End: 2023-10-05
Payer: COMMERCIAL

## 2023-10-05 VITALS — WEIGHT: 186 LBS | BODY MASS INDEX: 32.95 KG/M2 | SYSTOLIC BLOOD PRESSURE: 111 MMHG | DIASTOLIC BLOOD PRESSURE: 75 MMHG

## 2023-10-05 DIAGNOSIS — N93.9 ABNORMAL UTERINE BLEEDING (AUB): ICD-10-CM

## 2023-10-05 DIAGNOSIS — Z01.419 ENCOUNTER FOR GYNECOLOGICAL EXAMINATION (GENERAL) (ROUTINE) WITHOUT ABNORMAL FINDINGS: Primary | ICD-10-CM

## 2023-10-05 DIAGNOSIS — Z11.51 SCREENING FOR HUMAN PAPILLOMAVIRUS (HPV): ICD-10-CM

## 2023-10-05 PROCEDURE — 99395 PREV VISIT EST AGE 18-39: CPT | Performed by: OBSTETRICS & GYNECOLOGY

## 2023-10-05 RX ORDER — DOXYCYCLINE HYCLATE 100 MG
100 TABLET ORAL 2 TIMES DAILY
Qty: 14 TABLET | Refills: 0 | Status: SHIPPED | OUTPATIENT
Start: 2023-10-05 | End: 2023-10-12

## 2023-10-05 NOTE — PROGRESS NOTES
De Monday is a No obstetric history on file. ,  40 y.o. female White (non-) whose Patient's last menstrual period was 09/21/2023 (approximate). was on 9/21/2023 who presents for her annual checkup. She is having problems - has spotting after sex - was having irregular bleeding with IUD. Also has pelvic pain on left. No new partners      Menstrual status:    Her periods are light in flow, regular    She denies dysmenorrhea. Contraception:    The current method of family planning is IUD    Sexual history:    She  reports being sexually active and has had partner(s) who are male. She reports using the following method of birth control/protection: I.U.D..      Pap and Mammogram History:    Last Pap: 4/15/2016 NILM/ HPV criteria not met  She does not have a history of BOBBI 2, 3 or cervical cancer. With regard to the Gardisil vaccine, she is older than the FDA approved age to receive it       The patient does not have a family history of breast cancer. Family History   Problem Relation Age of Onset    Diabetes Paternal Grandfather     Heart Defect Brother     Diabetes Maternal Grandfather     Diabetes Paternal Grandmother     Stroke Maternal Grandfather     Heart Attack Maternal Grandfather     Diabetes Maternal Grandmother        Past Medical History:   Diagnosis Date    ADHD (attention deficit hyperactivity disorder), combined type     IUD (intrauterine device) in place 05/2017    Paraguard placed 5/2017     No past surgical history on file. No current outpatient medications on file. No current facility-administered medications for this visit. Allergies: Penicillins   Tobacco History:  reports that she has never smoked. She has never used smokeless tobacco.  Alcohol Abuse:  reports no history of alcohol use. Drug Abuse:  reports no history of drug use.     Patient Active Problem List   Diagnosis    Encounter for supervision of normal first pregnancy in first trimester    Pregnancy Reviewed telephone encounter w/ Dr. Soto from 4/1/2020.  Please have him increase omeprazole from 40 mg once daily to 40 mg twice daily.  Schedule telephone visit w/ me on Monday if not better by then.

## 2023-10-08 LAB
A VAGINAE DNA VAG QL NAA+PROBE: NORMAL SCORE
BVAB2 DNA VAG QL NAA+PROBE: NORMAL SCORE
C ALBICANS DNA VAG QL NAA+PROBE: NEGATIVE
C GLABRATA DNA VAG QL NAA+PROBE: NEGATIVE
C TRACH DNA VAG QL NAA+PROBE: NEGATIVE
MEGA1 DNA VAG QL NAA+PROBE: NORMAL SCORE
N GONORRHOEA DNA VAG QL NAA+PROBE: NEGATIVE
T VAGINALIS DNA VAG QL NAA+PROBE: NEGATIVE

## 2023-10-10 LAB
CYTOLOGIST CVX/VAG CYTO: NORMAL
CYTOLOGY CVX/VAG DOC CYTO: NORMAL
CYTOLOGY CVX/VAG DOC THIN PREP: NORMAL
DX ICD CODE: NORMAL
HPV GENOTYPE REFLEX: NORMAL
HPV I/H RISK 4 DNA CVX QL PROBE+SIG AMP: NEGATIVE
Lab: NORMAL
OTHER STN SPEC: NORMAL
STAT OF ADQ CVX/VAG CYTO-IMP: NORMAL

## 2023-10-19 NOTE — PROGRESS NOTES
Blayne Kirk is a 41 y.o. female presents for a problem visit. Chief Complaint   Patient presents with    Ultrasound     Pelvic pain     Patient's last menstrual period was 10/20/2023 (within days). Birth Control: IUD. 3/14/2022 placed Mirena  Last Pap: 10/5/2023 NILM/ HPV-  The patient is reporting having:  pelic pain and had ultrasound in our office today    She reports the symptoms are unchanged. TV ULTRASOUND PERFORMED. UTERUS IS ANTEVERTED, NORMAL IN SIZE AND ECHOGENICITY. ENDOMETRIUM MEASURES 6-7MM IN THICKNESS. NO EVIDENCE OF MASSES OR ABNORMALITIES ARE SEEN. IUD IS SEEN IN THE PROPER POSITION WITHIN THE ENDOMETRIAL CAVITY IN THE UTERINE FUNDUS. RIGHT OVARY APPEARS WITHIN NORMAL LIMITS. LEFT OVARY APPEARS WITHIN NORMAL LIMITS. NO FREE FLUID SEEN IN THE CDS  Examination chaperoned by Romulo Harper LPN.

## 2023-10-27 ENCOUNTER — OFFICE VISIT (OUTPATIENT)
Age: 38
End: 2023-10-27

## 2023-10-27 VITALS — SYSTOLIC BLOOD PRESSURE: 117 MMHG | BODY MASS INDEX: 32.95 KG/M2 | WEIGHT: 186 LBS | DIASTOLIC BLOOD PRESSURE: 80 MMHG

## 2023-10-27 DIAGNOSIS — N92.1 BREAKTHROUGH BLEEDING WITH IUD: Primary | ICD-10-CM

## 2023-10-27 DIAGNOSIS — R10.2 PELVIC PAIN IN FEMALE: ICD-10-CM

## 2023-10-27 DIAGNOSIS — Z97.5 BREAKTHROUGH BLEEDING WITH IUD: Primary | ICD-10-CM

## 2023-10-27 RX ORDER — DESVENLAFAXINE SUCCINATE 50 MG/1
TABLET, EXTENDED RELEASE ORAL
COMMUNITY
Start: 2023-10-16

## 2023-10-27 RX ORDER — LISDEXAMFETAMINE DIMESYLATE CAPSULES 30 MG/1
CAPSULE ORAL
COMMUNITY
Start: 2023-10-16

## 2023-10-27 NOTE — PROGRESS NOTES
OB/GYN Problem VIsit    HPI  Romulo Haney is a No obstetric history on file. ,  41 y.o. female who presents for a problem visit. Presents for fu pelvic pain and persistent BTB. Has Mirena. No new partners. Actually has no BTB since her last menses    TV ULTRASOUND PERFORMED. UTERUS IS ANTEVERTED, NORMAL IN SIZE AND ECHOGENICITY. ENDOMETRIUM MEASURES 6-7MM IN THICKNESS. NO EVIDENCE OF MASSES OR ABNORMALITIES ARE SEEN. IUD IS SEEN IN THE PROPER POSITION WITHIN THE ENDOMETRIAL CAVITY IN THE UTERINE FUNDUS. RIGHT OVARY APPEARS WITHIN NORMAL LIMITS. LEFT OVARY APPEARS WITHIN NORMAL LIMITS. NO FREE FLUID SEEN IN THE CDS    Past Medical History:   Diagnosis Date    ADHD (attention deficit hyperactivity disorder), combined type     IUD (intrauterine device) in place 05/2017    Paraguard placed 5/2017     No past surgical history on file. Social History     Occupational History    Not on file   Tobacco Use    Smoking status: Never    Smokeless tobacco: Never   Vaping Use    Vaping Use: Never used   Substance and Sexual Activity    Alcohol use: No    Drug use: No    Sexual activity: Yes     Partners: Male     Birth control/protection: I.U.D. Family History   Problem Relation Age of Onset    Diabetes Paternal Grandfather     Heart Defect Brother     Diabetes Maternal Grandfather     Diabetes Paternal Grandmother     Stroke Maternal Grandfather     Heart Attack Maternal Grandfather     Diabetes Maternal Grandmother        Allergies   Allergen Reactions    Penicillins Hives     Prior to Admission medications    Medication Sig Start Date End Date Taking?  Authorizing Provider   lisdexamfetamine (VYVANSE) 30 MG capsule TAKE 1 CAPSULE BY MOUTH EVERY DAY IN THE MORNING FOR 30 DAYS 10/16/23  Yes ProviderMelania MD   desvenlafaxine succinate (PRISTIQ) 50 MG TB24 extended release tablet  10/16/23  Yes Provider, MD Melania        Review of Systems: History obtained from the patient  Constitutional: negative for weight